# Patient Record
Sex: MALE | Race: WHITE
[De-identification: names, ages, dates, MRNs, and addresses within clinical notes are randomized per-mention and may not be internally consistent; named-entity substitution may affect disease eponyms.]

---

## 2017-02-05 ENCOUNTER — HOSPITAL ENCOUNTER (EMERGENCY)
Dept: HOSPITAL 10 - FTE | Age: 52
Discharge: HOME | End: 2017-02-05
Payer: COMMERCIAL

## 2017-02-05 VITALS
HEIGHT: 72 IN | BODY MASS INDEX: 27.77 KG/M2 | HEIGHT: 72 IN | WEIGHT: 205.03 LBS | BODY MASS INDEX: 27.77 KG/M2 | WEIGHT: 205.03 LBS

## 2017-02-05 VITALS
DIASTOLIC BLOOD PRESSURE: 72 MMHG | HEART RATE: 88 BPM | SYSTOLIC BLOOD PRESSURE: 154 MMHG | RESPIRATION RATE: 18 BRPM | TEMPERATURE: 97.6 F

## 2017-02-05 DIAGNOSIS — B34.9: Primary | ICD-10-CM

## 2017-02-05 DIAGNOSIS — I10: ICD-10-CM

## 2017-02-05 PROCEDURE — 99284 EMERGENCY DEPT VISIT MOD MDM: CPT

## 2017-02-05 NOTE — ERD
ER Documentation


Chief Complaint


Date/Time


DATE: 2/5/17 


TIME: 12:13


Chief Complaint


FLU SYMPTOMS COUGH RUNNY NOSE FEVER





HPI


This 51-year-old male who presents to the emergency department today 

complaining of viral like symptoms for the past 2 weeks. Patient if he traveled 

outside the door to visit his parents when he came back yesterday having 

symptoms consistent with influenza-like symptoms. States he has tried Tylenol, 

Robitussin and TheraFlu with limited improvement. Denies any vomiting or 

diarrhea currently denies any fevers currently.





ROS


All systems reviewed and are negative except as per history of present illness.





Medications


Home Meds


Active Scripts


Ibuprofen* (Motrin*) 600 Mg Tab, 600 MG PO Q6, #30 TAB


   Prov:URSZULA PARISH PA-C         2/5/17


Pseudoephedrine Hcl (Sudafed 12 Hour) 120 Mg Tablet.sa, 120 MG PO BID for 7 Days


   Prov:URSZULA PARISHC         2/5/17


Dextromethorphan Hb-Promethazine Hcl (Promethazine DM Syrup) 473 Ml Syrup, 5 ML 

PO Q6H Y for COUGH, #4 OZ


   Prov:URSZULA PARISHC         2/5/17


Hydrocodone/Acetaminophen (Norco  Tablet) 1 Each Tablet, 1 TAB PO Q6H Y 

for PAIN, #20 TAB


   Prov:URSZULA PARISHC         2/5/17


Ibuprofen* (Motrin*) 800 Mg Tab, 800 MG PO Q6H Y for PAIN AND OR ELEVATED TEMP, 

#30 TAB


   Prov:SUKUMAR CARRANZA NP         6/4/16


Hydrocodone Bit-Acetaminophen* (Norco*) 5-325 Mg Tab, 1 TAB PO Q6 Y for PAIN, #

7 TAB


   Prov:THOMPSON HARRINGTON DO         3/11/16


Indomethacin* (Indocin*) 50 Mg Cap, 50 MG PO TID, #14 CAP


   Prov:THOMPSON HARRINGTON DO         3/11/16


Colchicine* (Colcrys*) 0.6 Mg Tablet, 0.6 MG PO q1h, #3 TAB


   take 2 tabs initially, then 1 tab in 1 hour


   Prov:THOMPSON HARRINGTON DO         3/11/16


Indomethacin* (Indocin*) 25 Mg Capsule, 1-2 TAB PO Q8, #14 CAP


   Prov:CATALINA MENDIETA PA-C         7/9/15


Colchicine* (Colcrys*) 0.6 Mg Tablet, 0.6 MG PO ONCE for 1 Day, TAB


   take 2 tab once then 1 tab by mouth 1 hour later


   Prov:CATALINA MENDIETA PA-C         7/9/15





Allergies


Allergies:  


Coded Allergies:  


     No Known Allergy (Unverified , 6/4/16)





PMhx/Soc


History of Surgery:  Yes (VASECTOMY)


Anesthesia Reaction:  No


Hx Neurological Disorder:  No


Hx Respiratory Disorders:  No


Hx Cardiac Disorders:  Yes (HTN)


Hx Psychiatric Problems:  No


Hx Miscellaneous Medical Probl:  Yes (GOUT)


Hx Alcohol Use:  No


Hx Substance Use:  No


Hx Tobacco Use:  No





Physical Exam


Vitals





Vital Signs








  Date Time  Temp Pulse Resp B/P Pulse Ox O2 Delivery O2 Flow Rate FiO2


 


2/5/17 10:01 97.6 92 18 166/75 99   








Physical Exam


Const:    No acute distress


Head:   Atraumatic tenderness to palpation frontal sinus


Eyes:    Normal Conjunctiva


ENT:    Ears TMs normal. Nose no drainage. Throat no erythema no exudate


Neck:               Full range of motion..~ No meningismus.


Resp:    Clear to auscultation bilaterally. No absent breath sounds. no 

wheezing.


Cardio:    Regular rate and rhythm, no murmurs


Abd:    Soft, non tender, non distended. Normal bowel sounds


Skin:    No petechiae or rashes


Neur:    Awake and alert


Psych:    Normal Mood and Affect





Procedures/MDM


This 51-year-old male who presents to the emergency department today with signs 

consistent consistent with viral syndrome. Patient has had these symptoms for 

the past 2 weeks. He is afebrile here in the emergency department his oxygen 

saturations 99%. I did not feel the patient requires a chest x-ray at this 

time. Patient did have travel to [facility] but I do have low suspicion for 

malaria, Zeka dengue fever or other viral syndromes.





Patient will be given a prescription for Norco, promethazine and Sudafed to 

help treat his symptoms. Not felt the patient would benefit from Tamiflu at 

this time given the long duration of symptoms. I have explained To the patient 

he may have these symptoms for quite some time.





At this time the patient is stable for discharge and outpatient management. 

Patient should follow up with their PCP in the next 1-2 days.  They may return 

to the emergency department sooner for any persistent or worsening of symptoms.

  Patient understood and agreed with the plan. 





Discussed the patient with Dr. Shell and he is in agreement with the plan.





Departure


Diagnosis:  


 Primary Impression:  


 Viral syndrome


Condition:  Fair


Patient Instructions:  Viral Syndrome (Adult)





Additional Instructions:  


Call your primary care doctor TOMORROW for an appointment during the next 1-2 

days.See the doctor sooner or return here if your condition worsens before your 

appointment time.





Take Norco for severe pain otherwise take Tylenol or Motrin


Take Sudafed for congestion


Take promethazine for cough











URSZULA PARISH PA-C Feb 5, 2017 12:23

## 2017-12-25 ENCOUNTER — HOSPITAL ENCOUNTER (EMERGENCY)
Dept: HOSPITAL 10 - FTE | Age: 52
Discharge: HOME | End: 2017-12-25
Payer: COMMERCIAL

## 2017-12-25 VITALS
HEIGHT: 70 IN | WEIGHT: 210.98 LBS | BODY MASS INDEX: 30.2 KG/M2 | HEIGHT: 70 IN | BODY MASS INDEX: 30.2 KG/M2 | WEIGHT: 210.98 LBS

## 2017-12-25 DIAGNOSIS — I10: ICD-10-CM

## 2017-12-25 DIAGNOSIS — M79.671: Primary | ICD-10-CM

## 2017-12-25 PROCEDURE — 96372 THER/PROPH/DIAG INJ SC/IM: CPT

## 2017-12-25 PROCEDURE — 73630 X-RAY EXAM OF FOOT: CPT

## 2017-12-25 NOTE — ERD
ER Documentation


Chief Complaint


Chief Complaint


rt heel pain  x 1 week , no trauma h/o gout





HPI


52-year-old male history of gout presents to the emergency department 

complaining of right heel pain for the past week.  Patient denies any trauma.  

He states that he has taken ibuprofen yesterday without much relief.





ROS


All systems reviewed and are negative except as per history of present illness.





Medications


Home Meds


Active Scripts


Hydrocodone/Acetaminophen (Norco 5-325 Tablet) 1 Each Tablet, 1 TAB PO Q6H Y 

for PAIN, #20 TAB


   Prov:NIESHA SERRANO-C         12/25/17


Naproxen* (Naprosyn*) 500 Mg Tablet, 500 MG PO BID Y for PAIN AND/OR 

INFLAMMATION, #30 TAB


   Prov:NIESHA SERRANOC         12/25/17


Ibuprofen* (Motrin*) 600 Mg Tab, 600 MG PO Q6, #30 TAB


   Prov:URSZULA PARISHC         2/5/17


Pseudoephedrine Hcl (Sudafed 12 Hour) 120 Mg Tablet.sa, 120 MG PO BID for 7 Days


   Prov:URSZULA PARISHC         2/5/17


Dextromethorphan Hb-Promethazine Hcl (Promethazine DM Syrup) 473 Ml Syrup, 5 ML 

PO Q6H Y for COUGH, #4 OZ


   Prov:URSZULA PARISHC         2/5/17


Hydrocodone/Acetaminophen (Norco  Tablet) 1 Each Tablet, 1 TAB PO Q6H Y 

for PAIN, #20 TAB


   Prov:URSZULA PARISHC         2/5/17


Ibuprofen* (Motrin*) 800 Mg Tab, 800 MG PO Q6H Y for PAIN AND OR ELEVATED TEMP, 

#30 TAB


   Prov:SUKUMAR CARRANZA. NP         6/4/16


Hydrocodone Bit-Acetaminophen* (Norco*) 5-325 Mg Tab, 1 TAB PO Q6 Y for PAIN, #

7 TAB


   Prov:THOMPSON HARRINGTON DO         3/11/16


Indomethacin* (Indocin*) 50 Mg Cap, 50 MG PO TID, #14 CAP


   Prov:THOMPSON HARRINGTON DO         3/11/16


Colchicine* (Colcrys*) 0.6 Mg Tablet, 0.6 MG PO q1h, #3 TAB


   take 2 tabs initially, then 1 tab in 1 hour


   Prov:THOMPSON HARRINGTON          3/11/16


Indomethacin* (Indocin*) 25 Mg Capsule, 1-2 TAB PO Q8, #14 CAP


   Prov:CATALINA MENDIETA PA-C         7/9/15


Colchicine* (Colcrys*) 0.6 Mg Tablet, 0.6 MG PO ONCE for 1 Day, TAB


   take 2 tab once then 1 tab by mouth 1 hour later


   Prov:CATALINA MENDIETA PA-C         7/9/15





Allergies


Allergies:  


Coded Allergies:  


     No Known Allergy (Unverified , 6/4/16)





PMhx/Soc


History of Surgery:  Yes (VASECTOMY)


Anesthesia Reaction:  No


Hx Neurological Disorder:  No


Hx Respiratory Disorders:  No


Hx Cardiac Disorders:  Yes (HTN)


Hx Psychiatric Problems:  No


Hx Miscellaneous Medical Probl:  Yes (GOUT)


Hx Alcohol Use:  No


Hx Substance Use:  No


Hx Tobacco Use:  No





Physical Exam


Vitals





Vital Signs








  Date Time  Temp Pulse Resp B/P Pulse Ox O2 Delivery O2 Flow Rate FiO2


 


12/25/17 11:00 97.1 70 18 176/95 98   








Physical Exam


Const:  WDWN


Head:   Atraumatic 


Eyes:    Normal Conjunctiva


ENT:    Normal External Ears, Nose and Mouth.


Neck:               Full range of motion..~ No meningismus.


Resp:    Clear to auscultation bilaterally


Cardio:    Regular rate and rhythm, no murmurs


Abd:    Soft, non tender, non distended. Normal bowel sounds


Skin:    No petechiae or rashes


Back:    No midline or flank tenderness


Ext:    No cyanosis, or edema


TTP on right Achilles tendon region and heel


Neur:    Awake and alert


Psych:    Normal Mood and Affect


Results 24 hrs





 Current Medications








 Medications


  (Trade)  Dose


 Ordered  Sig/Stevenson


 Route


 PRN Reason  Start Time


 Stop Time Status Last Admin


Dose Admin


 


 Ketorolac


 Tromethamine


  (Toradol)  30 mg  ONCE  STAT


 IM


   12/25/17 12:08


 12/25/17 12:09 DC 12/25/17 12:36


 











Procedures/MDM


This is a 52-year-old male presenting to emergency department complaining of 

right heel pain, differentials include but not limited to gout, strain, neuroma

, or fasciitis.  There is no evidence of septic arthritis, fracture or acute 

emergent pathology.  Patient was given prescription for naproxen and discussed 

the follow-up with orthopedist.  Discussed return to the ER for any worsening 

symptoms patient understands with his





Departure


Diagnosis:  


 Primary Impression:  


 Foot pain


Condition:  Stable


Patient Instructions:  Treating Gout Attacks, Gout Diet


Referrals:  


ADELFO DELGADO MD (PCP)





Additional Instructions:  


Visite a kimball nancy dum[place]ana para un EXAMEN.Regrese a estas instalaciones si no se 

mejora zhanna esperbamos o zhanna le dijimos.








[place] toda la medicina tameka y zhanna se le indic.








La medicina que se le recet puede causarle sueo.NO DEBE MANEJAR NI OPERAR 

MAQUINARIAS PELIGROSAS mientras esta tomando esta medicina!











NIESHA SERRANO PA-C Dec 25, 2017 14:29

## 2017-12-25 NOTE — RADRPT
PROCEDURE:   XR Foot. 

 

CLINICAL INDICATION:  Foot pain.

 

TECHNIQUE:    Three views of the right foot are available for review. 

 

COMPARISON:   None available

 

FINDINGS:

 

There is no acute osseous or articular abnormality.  No evidence for fracture. Bone mineral density 
is preserved.  The articular surfaces are smooth without evidence of marginal erosions. Enthesopathi
c changes seen at the Achilles insertion and plantar calcaneal margin.. There is minimal contour irr
egularity at the soft tissues posterior to the calcaneus. Vascular calcifications are present.

 

IMPRESSION:

 

1.  No acute osseous abnormality or evidence of osteomyelitis. Please note that MRI is more sensitiv
e in evaluating for early changes of osteomyelitis. 

2.  Minimal soft tissue contour regularity at the posterior aspect the calcaneus, which may represen
t an area of injury or wound, to be correlated clinically.

 

RPTAT: HH

_____________________________________________ 

.Rizwan Harrison MD, MD           Date    Time 

Electronically viewed and signed by .Rizwan Harrison MD, MD on 12/25/2017 12:55 

 

D:  12/25/2017 12:55  T:  12/25/2017 12:55

.d/

## 2019-01-06 ENCOUNTER — HOSPITAL ENCOUNTER (EMERGENCY)
Dept: HOSPITAL 91 - FTE | Age: 54
Discharge: HOME | End: 2019-01-06
Payer: COMMERCIAL

## 2019-01-06 ENCOUNTER — HOSPITAL ENCOUNTER (EMERGENCY)
Dept: HOSPITAL 10 - FTE | Age: 54
Discharge: HOME | End: 2019-01-06
Payer: COMMERCIAL

## 2019-01-06 VITALS
HEIGHT: 66 IN | HEIGHT: 66 IN | WEIGHT: 212.75 LBS | WEIGHT: 212.75 LBS | BODY MASS INDEX: 34.19 KG/M2 | BODY MASS INDEX: 34.19 KG/M2

## 2019-01-06 VITALS — SYSTOLIC BLOOD PRESSURE: 190 MMHG | DIASTOLIC BLOOD PRESSURE: 95 MMHG

## 2019-01-06 VITALS — RESPIRATION RATE: 16 BRPM | HEART RATE: 80 BPM

## 2019-01-06 DIAGNOSIS — I10: ICD-10-CM

## 2019-01-06 DIAGNOSIS — M10.9: Primary | ICD-10-CM

## 2019-01-06 PROCEDURE — 99284 EMERGENCY DEPT VISIT MOD MDM: CPT

## 2019-01-06 PROCEDURE — 96372 THER/PROPH/DIAG INJ SC/IM: CPT

## 2019-01-06 RX ADMIN — KETOROLAC TROMETHAMINE 1 MG: 30 INJECTION, SOLUTION INTRAMUSCULAR at 18:57

## 2019-01-06 NOTE — ERD
ER Documentation


Chief Complaint


Chief Complaint





Complains of left foot pain Hx of GOUT





HPI


This 53-year-old male presents with left foot pain for last week.  He has a 


history of gout and it feels similar to previous episodes, last of which was a 


year ago.  Denies any fevers, restricted range of motion, weakness, history of 


injury.





ROS


All systems reviewed and are negative except as per history of present illness.





Medications


Home Meds


Active Scripts


Hydrocodone/Acetaminophen (Norco 5-325 Tablet) 1 Each Tablet, 1 TAB PO Q6H PRN 


for PAIN, #7 TAB


   Prov:MARIETTA DAVEY MD         1/6/19


Colchicine* (Colcrys*) 0.6 Mg Tablet, 0.6 MG PO BID for 2 Days, #4 TAB


   Prov:MARIETTA DAVEY MD         1/6/19


Indomethacin* (Indocin*) 25 Mg Capsule, 25 MG PO Q6, #20 CAP


   Prov:MARIETTA DAVEY MD         1/6/19


Hydrocodone/Acetaminophen (Norco 5-325 Tablet) 1 Each Tablet, 1 TAB PO Q6H PRN 


for PAIN, #20 TAB


   Prov:NIESHA SERRANOC         12/25/17


Naproxen* (Naprosyn*) 500 Mg Tablet, 500 MG PO BID PRN for PAIN AND/OR 


INFLAMMATION, #30 TAB


   Prov:NIESHA SERRANO-C         12/25/17


Ibuprofen* (Motrin*) 600 Mg Tab, 600 MG PO Q6, #30 TAB


   Prov:URSZULA PARISH PA-C         2/5/17


Pseudoephedrine Hcl (Sudafed 12 Hour) 120 Mg Tablet.sa, 120 MG PO BID for 7 Days


   Prov:URSZULA PARISH PA-C         2/5/17


Dextromethorphan Hb-Promethazine Hcl (Promethazine DM Syrup) 473 Ml Syrup, 5 ML 


PO Q6H PRN for COUGH, #4 OZ


   Prov:URSZULA PARISHC         2/5/17


Hydrocodone/Acetaminophen (Norco  Tablet) 1 Each Tablet, 1 TAB PO Q6H PRN 


for PAIN, #20 TAB


   Prov:URSZULA PARISHC         2/5/17


Ibuprofen* (Motrin*) 800 Mg Tab, 800 MG PO Q6H PRN for PAIN AND OR ELEVATED 


TEMP, #30 TAB


   Prov:SUKUMAR CARRANZA NP         6/4/16


Hydrocodone Bit-Acetaminophen* (Norco*) 5-325 Mg Tab, 1 TAB PO Q6 PRN for PAIN, 


#7 TAB


   Prov:THOMPSON HARRINGTON DO         3/11/16


Indomethacin* (Indocin*) 50 Mg Cap, 50 MG PO TID, #14 CAP


   Prov:THOMPSON HARRINGTON DO         3/11/16


Colchicine* (Colcrys*) 0.6 Mg Tablet, 0.6 MG PO q1h, #3 TAB


   take 2 tabs initially, then 1 tab in 1 hour


   Prov:THOMPSON HARRINGTON DO         3/11/16


Indomethacin* (Indocin*) 25 Mg Capsule, 1-2 TAB PO Q8, #14 CAP


   Prov:CATALINA MENDIETA PA-C         7/9/15


Colchicine* (Colcrys*) 0.6 Mg Tablet, 0.6 MG PO ONCE for 1 Day, TAB


   take 2 tab once then 1 tab by mouth 1 hour later


   Prov:CATALINA MENDIETA PA-C         7/9/15





Allergies


Allergies:  


Coded Allergies:  


     No Known Allergy (Unverified , 6/4/16)





PMhx/Soc


History of Surgery:  Yes (VASECTOMY)


Anesthesia Reaction:  No


Hx Neurological Disorder:  No


Hx Respiratory Disorders:  No


Hx Cardiac Disorders:  Yes (HTN)


Hx Psychiatric Problems:  No


Hx Miscellaneous Medical Probl:  Yes (GOUT)


Hx Alcohol Use:  No


Hx Substance Use:  No


Hx Tobacco Use:  No





FmHx


Family History:  No diabetes, No coronary disease, No other





Physical Exam


Vitals





Vital Signs


  Date      Temp  Pulse  Resp  B/P (MAP)   Pulse Ox  O2          O2 Flow    FiO2


Time                                                 Delivery    Rate


    1/6/19  97.3     84    20      190/95        99


     16:19                          (126)





Physical Exam


Const:   No acute distress


Head:   Atraumatic 


Eyes:    Normal Conjunctiva


ENT:    Normal External Ears, Nose and Mouth.


Neck:               Full range of motion. No meningismus.


Resp:   Clear to auscultation bilaterally


Cardio:   Regular rate and rhythm, no murmurs


Abd:    Soft, non tender, non distended. Normal bowel sounds


Skin:   No petechiae or rashes


Back:   No midline or flank tenderness


Ext:    No cyanosis, or edema mild swelling and tenderness left tarsometatarsal 


area.  No significant erythema, warmth, streaking or effusion, no restricted 


range of motion, weakness or deformity.


Neur:   Awake and alert


Psych:    Normal Mood and Affect


Results 24 hrs





Current Medications


 Medications
   Dose
          Sig/Stevenson
       Start Time
   Status  Last


 (Trade)       Ordered        Route
 PRN     Stop Time              Admin
Dose


                              Reason                                Admin


                12 mg          ONCE  ONCE
    1/6/19                 



Dexamethasone                 PO
            19:00
 1/6/19



  (Decadron)                                19:01


 Ketorolac
     60 mg          ONCE  STAT
    1/6/19        DC       



Tromethamine
                 IM
            18:34
 1/6/19


 (Toradol)                                   18:35








Procedures/MDM


She presents with left foot pain for last week.  Which is nontraumatic and a 


history of gout.  Is consistent with an acute flareup of gout.  Doubt septic 


arthritis, fracture, dislocation, bacterial infection, ischemia or deficits.  


Patient was given Toradol 60 mg IM and Decadron 12 grams by mouth.  Will treated


with a short course of Ultram seen, Norco, Indocin, primary care follow-up and 


return precautions.  Patient does not have a cures report.  The patient was 


stable with no new complaints during the ER course. Clinically, there is no 


current evidence to suggest meningitis, sepsis, acute abdomen, pneumonia, 


stroke,  acute coronary syndrome, pulmonary embolism, aortic dissection or any 


other emergent condition appearing to require further evaluation or 


hospitalization.  Patient counseled regarding my diagnostic impression and care 


plan. Prior to discharge all questions answered. Pt agrees with treatment plan 


and understands strict return precautions. Pt is instructed to follow up with 


primary care provider within 24-48 hours. Precautionary instructions provided 


including instructions to return to the ER if not improving or for any worsening


or changing symptoms or concerns.





Departure


Diagnosis:  


   Primary Impression:  


   Gout


   Gout site:  foot  Gout etiology:  unspecified cause  Chronicity:  acute  


   Laterality:  left  Qualified Codes:  M10.9 - Gout, unspecified


Condition:  Stable


Patient Instructions:  Gouty Arthritis





Additional Instructions:  


For worsening redness, fevers, new worsening.  Drink plenty of fluids at home.











MARIETTA DAVEY MD              Jan 6, 2019 18:40

## 2019-02-04 ENCOUNTER — HOSPITAL ENCOUNTER (EMERGENCY)
Dept: HOSPITAL 91 - FTE | Age: 54
LOS: 1 days | Discharge: HOME | End: 2019-02-05
Payer: COMMERCIAL

## 2019-02-04 ENCOUNTER — HOSPITAL ENCOUNTER (EMERGENCY)
Dept: HOSPITAL 10 - FTE | Age: 54
LOS: 1 days | Discharge: HOME | End: 2019-02-05
Payer: COMMERCIAL

## 2019-02-04 VITALS
WEIGHT: 216.93 LBS | BODY MASS INDEX: 32.13 KG/M2 | WEIGHT: 216.93 LBS | HEIGHT: 69 IN | HEIGHT: 69 IN | BODY MASS INDEX: 32.13 KG/M2

## 2019-02-04 DIAGNOSIS — M10.9: Primary | ICD-10-CM

## 2019-02-04 PROCEDURE — 73610 X-RAY EXAM OF ANKLE: CPT

## 2019-02-04 PROCEDURE — 99283 EMERGENCY DEPT VISIT LOW MDM: CPT

## 2019-02-05 VITALS — SYSTOLIC BLOOD PRESSURE: 154 MMHG | HEART RATE: 80 BPM | RESPIRATION RATE: 16 BRPM | DIASTOLIC BLOOD PRESSURE: 89 MMHG

## 2019-02-05 RX ADMIN — HYDROCODONE BITARTRATE AND ACETAMINOPHEN 1 TAB: 5; 325 TABLET ORAL at 00:28

## 2019-02-05 NOTE — ERD
ER Documentation


Chief Complaint


Chief Complaint





LEFT FOOT PAIN X3DAYS; HX OF GOUT





HPI


53-year-old male presenting to the emergency department complaining of left 


ankle pain for the past 3 days.  Pain is constant, worse with walking, rated 


9/10 in severity.  He took ibuprofen at home with some relief.  Patient does 


report history of gout in the left great toe.





ROS


All systems reviewed and are negative except as per history of present illness.





Medications


Home Meds


Active Scripts


Indomethacin* (Indocin*) 25 Mg Capsule, 25 MG PO TID, #15 CAP


   Prov:MINNIE ROBERTS PA-C         19


Colchicine* (Colcrys*) 0.6 Mg Tablet, 0.6 MG PO DAILY, #10 TAB


   Prov:MINNIE ROBERTS PA-C         19


Hydrocodone/Acetaminophen (Norco 5-325 Tablet) 1 Each Tablet, 1 TAB PO Q6H PRN 


for PAIN, #7 TAB


   Prov:MARIETTA DAVEY MD         19


Colchicine* (Colcrys*) 0.6 Mg Tablet, 0.6 MG PO BID for 2 Days, #4 TAB


   Prov:MARIETTA DAVEY MD         19


Indomethacin* (Indocin*) 25 Mg Capsule, 25 MG PO Q6, #20 CAP


   Prov:MARIETTA DAVEY MD         19


Hydrocodone/Acetaminophen (Norco 5-325 Tablet) 1 Each Tablet, 1 TAB PO Q6H PRN 


for PAIN, #20 TAB


   Prov:NIESHA SERRANO PA-C         17


Naproxen* (Naprosyn*) 500 Mg Tablet, 500 MG PO BID PRN for PAIN AND/OR 


INFLAMMATION, #30 TAB


   Prov:NIESHA SERRANO PA-C         17


Ibuprofen* (Motrin*) 600 Mg Tab, 600 MG PO Q6, #30 TAB


   Prov:URSZULA PARISH PA-C         17


Pseudoephedrine Hcl (Sudafed 12 Hour) 120 Mg Tablet.sa, 120 MG PO BID for 7 Days


   Prov:URSZULA PARISH PA-C         17


Dextromethorphan Hb-Promethazine Hcl (Promethazine DM Syrup) 473 Ml Syrup, 5 ML 


PO Q6H PRN for COUGH, #4 OZ


   Prov:URSZULA PARISH PA-C         17


Hydrocodone/Acetaminophen (Norco  Tablet) 1 Each Tablet, 1 TAB PO Q6H PRN 


for PAIN, #20 TAB


   Prov:URSZULA PARISHC         17


Ibuprofen* (Motrin*) 800 Mg Tab, 800 MG PO Q6H PRN for PAIN AND OR ELEVATED 


TEMP, #30 TAB


   Prov:SUKUMAR CARRANZA NP         16


Hydrocodone Bit-Acetaminophen* (Norco*) 5-325 Mg Tab, 1 TAB PO Q6 PRN for PAIN, 


#7 TAB


   Prov:LENTHOMPSON          3/11/16


Indomethacin* (Indocin*) 50 Mg Cap, 50 MG PO TID, #14 CAP


   Prov:LEN,THOMPSON DO         3/11/16


Colchicine* (Colcrys*) 0.6 Mg Tablet, 0.6 MG PO q1h, #3 TAB


   take 2 tabs initially, then 1 tab in 1 hour


   Prov:LENTHOMPSON          3/11/16


Indomethacin* (Indocin*) 25 Mg Capsule, 1-2 TAB PO Q8, #14 CAP


   Prov:CATALINA MENDIETA PA-C         7/9/15


Colchicine* (Colcrys*) 0.6 Mg Tablet, 0.6 MG PO ONCE for 1 Day, TAB


   take 2 tab once then 1 tab by mouth 1 hour later


   Prov:CATALINA MENDIETA PA-C         7/9/15





Allergies


Allergies:  


Coded Allergies:  


     No Known Allergy (Unverified , 16)





PMhx/Soc


History of Surgery:  Yes (VASECTOMY)


Anesthesia Reaction:  No


Hx Neurological Disorder:  No


Hx Respiratory Disorders:  No


Hx Cardiac Disorders:  Yes (HTN)


Hx Psychiatric Problems:  No


Hx Miscellaneous Medical Probl:  Yes (GOUT)


Hx Alcohol Use:  No


Hx Substance Use:  No


Hx Tobacco Use:  No





FmHx


Family History:  No diabetes





Physical Exam


Vitals





Vital Signs


  Date      Temp  Pulse  Resp  B/P (MAP)   Pulse Ox  O2          O2 Flow    FiO2


Time                                                 Delivery    Rate


    19  98.2     80    16      154/89       100  Room Air


     02:11                          (110)


    19  97.2     91    18      166/89        99


     21:43                          (114)





Physical Exam


Const:   No acute distress


Head:   Atraumatic 


Eyes:    Normal Conjunctiva


ENT:    Normal External Ears, Nose and Mouth.


Neck:               Full range of motion. No meningismus.


Resp:   No respiratory distress.


Skin:   No petechiae or rashes


Back:   No midline or flank tenderness


Ext:    There is edema with associated tenderness palpation of the left ankle.  


No obvious deformity.  There is no ecchymosis.  Range of motion is limited 


secondary to pain.  There is no lymphatic streaking.  Patient is neurovascularly


intact to the left lower extremity.


Neur:   Awake and alert


Psych:    Normal Mood and Affect


Results 24 hrs





Current Medications


 Medications
   Dose
          Sig/Stevenson
       Start Time
   Status  Last


 (Trade)       Ordered        Route
 PRN     Stop Time              Admin
Dose


                              Reason                                Admin


                1 tab          ONCE  ONCE
    19        DC            19


Acetaminophen                 PO
            00:30
 19                00:28



/
                                           00:31


Hydrocodone


Bitart



([facility]


())


[place] [address]


                        Radiology Main Line: [phone]





                            DIAGNOSTIC IMAGING REPORT





Patient: REILLY DAVILA   : 1965   Age: 53  Sex: M                       





       MR #:    [id number]   Acct #:   [id number]    DOS: 19 0000


Ordering MD: MINNIE ROBERTS PA-C   Location:  [facility]   Room/Bed:             


                              


                                        


PROCEDURE:    Left ankle. 


 


CLINICAL INDICATION:    Pain. 


 


TECHNIQUE:    Three views including AP, lateral and oblique views of the left 


ankle were performed. 


 


COMPARISON:   None. 


 


FINDINGS:


There is no fracture, dislocation or bone destruction.  The ankle mortise is 


within normal limits.  Bone mineralization is within normal limits.  There is no


radiopaque foreign body or abnormal calcification. There is soft tissue swelling


over the lateral malleolus. There are small dorsal and plantar calcaneal spurs.


 


IMPRESSION:


No evidence of fracture. 


 


Lateral soft tissue swelling.


 


Small calcaneal spurs.


_____________________________________________ 


.Jericho Ramsey MD, MD           Date    Time 


Electronically viewed and signed by .Jericho Ramsey MD, MD on 2019 01:52 


 


D:  2019 01:52  T:  2019 01:52


.T/





CC: MINNIE ROBERTS PA-C





[phone]








Procedures/MDM


53-year-old male presenting to the emergency department with signs and symptoms 


most consistent with acute exacerbation of gout.  X-ray of the left ankle is 


negative for any obvious abnormalities.  The full report from the radiologist 


may be viewed above.  Patient was treated in the department with [facility] with good


response.  Patient was otherwise stable for discharge with further treatment as 


an outpatient.  Patient agreed with the diagnosis, plan, need for follow-up, 


return precautions.





Patient's blood pressure was elevated (>120/80) but appears stable without 


evidence of hypertension emergency or urgency.  The patient is to follow-up and 


pursue outpatient monitoring and therapy with their primary care physician 


within 1 week and return immediately if they have any new, worsening, or 


concerning symptoms.





Departure


Diagnosis:  


   Primary Impression:  


   Gout flare


   Gout site:  ankle  Gout etiology:  unspecified cause  Laterality:  left  


   Qualified Codes:  M10.9 - Gout, unspecified


Condition:  Fair





Additional Instructions:  


Follow up with your PCP within the next 1-3 days for a repeat evaluation. If you


require a referral to a specialist, your Primary Care Provider may be able to 


provide this for you. In most patient cases, a referral is not required. If you 


have further questions regarding this matter, please ask your Primary Care 


Provider. Return the the emergency department immediately if symptoms worsen or 


change. If you have any questions regarding medications, ask your pharmacist or 


us before you leave. If any adverse reactions,  occur while taking your 


medications, discontinue the treatment and return to the emergency department 


immediately.  If any new or worsening symptoms, uncontrolled fevers, or other 


unexplained symptoms occur, return to the emergency department immediately. Take


your medications as directed, and complete the entire course of treatment.











MINNIE ROBERTS PA-C        2019 01:39